# Patient Record
Sex: FEMALE | Race: WHITE | NOT HISPANIC OR LATINO
[De-identification: names, ages, dates, MRNs, and addresses within clinical notes are randomized per-mention and may not be internally consistent; named-entity substitution may affect disease eponyms.]

---

## 2017-05-31 PROBLEM — Z00.00 ENCOUNTER FOR PREVENTIVE HEALTH EXAMINATION: Status: ACTIVE | Noted: 2017-05-31

## 2017-06-29 ENCOUNTER — APPOINTMENT (OUTPATIENT)
Dept: OPHTHALMOLOGY | Facility: CLINIC | Age: 57
End: 2017-06-29

## 2023-08-01 ENCOUNTER — APPOINTMENT (OUTPATIENT)
Dept: PHYSICAL MEDICINE AND REHAB | Facility: CLINIC | Age: 63
End: 2023-08-01
Payer: COMMERCIAL

## 2023-08-01 VITALS
SYSTOLIC BLOOD PRESSURE: 100 MMHG | DIASTOLIC BLOOD PRESSURE: 56 MMHG | HEART RATE: 71 BPM | BODY MASS INDEX: 23.23 KG/M2 | HEIGHT: 67 IN | WEIGHT: 148 LBS

## 2023-08-01 DIAGNOSIS — Z86.79 PERSONAL HISTORY OF OTHER DISEASES OF THE CIRCULATORY SYSTEM: ICD-10-CM

## 2023-08-01 DIAGNOSIS — Z82.61 FAMILY HISTORY OF ARTHRITIS: ICD-10-CM

## 2023-08-01 DIAGNOSIS — Z78.9 OTHER SPECIFIED HEALTH STATUS: ICD-10-CM

## 2023-08-01 PROCEDURE — 99204 OFFICE O/P NEW MOD 45 MIN: CPT

## 2023-08-01 NOTE — HISTORY OF PRESENT ILLNESS
[FreeTextEntry1] : Ms. IQRA OLEA is a very pleasant 63 year female who seen for evaluation of feet that get tingly and heavy after sleep that has been ongoing for 2 years  without any specific injury or inciting event. and progressive in frequency and intensity  The pain is located primarily entire foot like a vice around ankle  intermittent in nature and described as dull prolonged tingling  The pain is rated as 0/10 during today's visit, and ranges from 0-8/10. The patient's symptoms are aggravated by lying down sometimes walking   and alleviated by activity gets better with walking around . The patient denies any night pain, hand numbness/tingling, no weakness, or bowel/bladder dysfunction. The patient has no other complaints at this time. she is a teacher stands a lot  no back pain to speak of  she has varicose veins

## 2023-08-01 NOTE — ASSESSMENT
[FreeTextEntry1] :  PLAN AND RECOMMENDATIONS :  We discussed differential diagnosis and clinical impression agree with EMG rule out neuropathy   Recommend .symptomatic care and support  medications   imaging   referral to  hydrotherapy /heat / cold for pain  continue  relative rest and avoidance of painful activity where possible   increasing activity as discussed   return for follow up

## 2023-08-01 NOTE — CONSULT LETTER
[FreeTextEntry1] : Dear Dr. VASQUEZ ,   I had the pleasure of evaluating your patient, IQRA OLEA .  Thank you very much for allowing me to participate in the care of this patient. If you have any questions, please do not hesitate to contact me.  HOW ARE YOU ???? Sincerely,  Charis Estrella MD   ABP[facility] Board Certified in Physical Medicine and Rehabilitation Certified Fellow of AANEM (Neuromuscular and Electrodiagnostic Medicine) Subspecialty certified in Sports Medicine (ABPMR)   of Physical Medicine and Rehabilitation [employer] [facility] [employer] [facility]

## 2023-08-01 NOTE — PHYSICAL EXAM
[FreeTextEntry1] : PHYSICAL EXAM : OBJECTIVE   GENERAL : Awake ,alert and oriented to time place and person  HEAD : normocephalic and atraumatic  NECK : supple ,no tracheal deviation ,no thyroid enlargement noted with swallowing EYES : sclera and conjunctiva normal no redness,intact extraocular movements  ENT  : ears and nose normal in appearance -hearing adequate  PULMONARY: effort normal. No respiratory distress. breathing regular. No wheezes  LYMPH : No swelling in limbs, capillary return within normal range  CVS : warm extremities,no palpitations,not short of breath, no visible jugular venous distention PSYCH : mood and affect normal ,good eye contact ,normal attention  ABDOMEN : no visible distension ,  NEUROLOGICAL:cranial nerves intact,muscle tone normal,gait and balance safe except where noted below  SKIN : warm and dry No rash detected over specific body areas examined  MUSCULOSKELETAL: normal muscle bulk, no focal bony tenderness /posture normal except where specified below vibration intact  no foot drop  gait NL  sensation intact  balance fine No long tract signs found on clinical exam and no focal neurological deficits reflexes 1 + down going toes

## 2023-08-04 ENCOUNTER — LABORATORY RESULT (OUTPATIENT)
Age: 63
End: 2023-08-04

## 2023-08-04 ENCOUNTER — APPOINTMENT (OUTPATIENT)
Dept: PHYSICAL MEDICINE AND REHAB | Facility: CLINIC | Age: 63
End: 2023-08-04
Payer: COMMERCIAL

## 2023-08-04 DIAGNOSIS — R52 PAIN, UNSPECIFIED: ICD-10-CM

## 2023-08-04 PROCEDURE — 95886 MUSC TEST DONE W/N TEST COMP: CPT

## 2023-08-04 PROCEDURE — 95913 NRV CNDJ TEST 13/> STUDIES: CPT

## 2023-08-04 NOTE — PROCEDURE
[de-identified] : EMG /NERVE CONDUCTION STUDY performed today without complication  Tabulated data, wave forms , conclusions and recommendations are attached and in the procedure report.  Please refer to the scanned study attached to this encounter  Full history and focused clinical exam performed prior to the examination and documented in report

## 2023-08-07 LAB
24R-OH-CALCIDIOL SERPL-MCNC: 50.9 PG/ML
ALBUMIN MFR SERPL ELPH: 62.6 %
ALBUMIN SERPL-MCNC: 4.5 G/DL
ALBUMIN/GLOB SERPL: 1.7 RATIO
ALPHA1 GLOB MFR SERPL ELPH: 3.6 %
ALPHA1 GLOB SERPL ELPH-MCNC: 0.3 G/DL
ALPHA2 GLOB MFR SERPL ELPH: 9 %
ALPHA2 GLOB SERPL ELPH-MCNC: 0.6 G/DL
B-GLOBULIN MFR SERPL ELPH: 11 %
B-GLOBULIN SERPL ELPH-MCNC: 0.8 G/DL
CRP SERPL-MCNC: <3 MG/L
ERYTHROCYTE [SEDIMENTATION RATE] IN BLOOD BY WESTERGREN METHOD: 6 MM/HR
ESTIMATED AVERAGE GLUCOSE: 111 MG/DL
FOLATE SERPL-MCNC: 15.4 NG/ML
GAMMA GLOB FLD ELPH-MCNC: 1 G/DL
GAMMA GLOB MFR SERPL ELPH: 13.8 %
HBA1C MFR BLD HPLC: 5.5 %
INTERPRETATION SERPL IEP-IMP: NORMAL
PROT SERPL-MCNC: 7.2 G/DL
PROT SERPL-MCNC: 7.2 G/DL
RHEUMATOID FACT SER QL: <10 IU/ML
T3FREE SERPL-MCNC: 2.64 PG/ML
T3RU NFR SERPL: 1 TBI
T4 FREE SERPL-MCNC: 1.1 NG/DL
TSH SERPL-ACNC: 2.2 UIU/ML
URATE SERPL-MCNC: 4.3 MG/DL
VIT B12 SERPL-MCNC: 498 PG/ML

## 2023-08-08 LAB — ANA SER IF-ACNC: NEGATIVE

## 2023-08-10 ENCOUNTER — NON-APPOINTMENT (OUTPATIENT)
Age: 63
End: 2023-08-10

## 2023-10-11 ENCOUNTER — APPOINTMENT (OUTPATIENT)
Dept: PHYSICAL MEDICINE AND REHAB | Facility: CLINIC | Age: 63
End: 2023-10-11
Payer: COMMERCIAL

## 2023-10-11 VITALS — BODY MASS INDEX: 23.23 KG/M2 | WEIGHT: 148 LBS | HEIGHT: 67 IN

## 2023-10-11 DIAGNOSIS — R20.0 ANESTHESIA OF SKIN: ICD-10-CM

## 2023-10-11 DIAGNOSIS — R20.2 PARESTHESIA OF SKIN: ICD-10-CM

## 2023-10-11 PROCEDURE — 99214 OFFICE O/P EST MOD 30 MIN: CPT | Mod: 95

## 2023-10-11 RX ORDER — CALCIUM CARBONATE/VITAMIN D3 600MG-62.5
300-333 CAPSULE ORAL
Qty: 30 | Refills: 2 | Status: ACTIVE | COMMUNITY
Start: 2023-10-11 | End: 1900-01-01

## 2023-11-16 ENCOUNTER — APPOINTMENT (OUTPATIENT)
Dept: PHYSICAL MEDICINE AND REHAB | Facility: CLINIC | Age: 63
End: 2023-11-16
Payer: COMMERCIAL

## 2023-11-16 VITALS — HEIGHT: 67 IN | BODY MASS INDEX: 23.23 KG/M2 | WEIGHT: 148 LBS
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
## 2023-11-16 DIAGNOSIS — G56.02 CARPAL TUNNEL SYNDROME, LEFT UPPER LIMB: ICD-10-CM

## 2023-11-16 DIAGNOSIS — G60.8 OTHER HEREDITARY AND IDIOPATHIC NEUROPATHIES: ICD-10-CM

## 2023-11-16 PROCEDURE — 99213 OFFICE O/P EST LOW 20 MIN: CPT | Mod: 95
